# Patient Record
Sex: MALE | Race: WHITE | NOT HISPANIC OR LATINO | URBAN - NONMETROPOLITAN AREA
[De-identification: names, ages, dates, MRNs, and addresses within clinical notes are randomized per-mention and may not be internally consistent; named-entity substitution may affect disease eponyms.]

---

## 2019-10-31 ENCOUNTER — IMPORTED ENCOUNTER (OUTPATIENT)
Dept: URBAN - NONMETROPOLITAN AREA CLINIC 1 | Facility: CLINIC | Age: 49
End: 2019-10-31

## 2019-10-31 PROBLEM — H52.03: Noted: 2019-10-31

## 2019-10-31 PROCEDURE — S0620 ROUTINE OPHTHALMOLOGICAL EXA: HCPCS

## 2019-10-31 PROCEDURE — 92310 CONTACT LENS FITTING OU: CPT

## 2019-10-31 NOTE — PATIENT DISCUSSION
*Gls RX:.-	  A new spectacle prescription was created and dispensed today. *Contact lens habits:.-	  Healthy contact lens wearing habits were discussed including nightly removal and cleaning of contacts and adherence to the recommended replacement schedule for disposable lenses. -	  Symptoms of infection were reviewed and instructions were given to discontinue contact lens use and call for an urgent appointment in the event of an eye infection. *Cl fit:.-	  Contact lenses fit well appropriately moving and re-centering with each blink. mono os only

## 2019-11-07 ENCOUNTER — IMPORTED ENCOUNTER (OUTPATIENT)
Dept: URBAN - NONMETROPOLITAN AREA CLINIC 1 | Facility: CLINIC | Age: 49
End: 2019-11-07

## 2019-11-25 ENCOUNTER — IMPORTED ENCOUNTER (OUTPATIENT)
Dept: URBAN - NONMETROPOLITAN AREA CLINIC 1 | Facility: CLINIC | Age: 49
End: 2019-11-25

## 2022-04-10 ASSESSMENT — TONOMETRY
OS_IOP_MMHG: 10
OD_IOP_MMHG: 10

## 2022-04-10 ASSESSMENT — VISUAL ACUITY
OD_CC: J1
OS_CC: 20/20-2
OD_CC: 20/20-1
OS_CC: J1